# Patient Record
Sex: FEMALE | Race: OTHER | NOT HISPANIC OR LATINO | URBAN - METROPOLITAN AREA
[De-identification: names, ages, dates, MRNs, and addresses within clinical notes are randomized per-mention and may not be internally consistent; named-entity substitution may affect disease eponyms.]

---

## 2023-11-06 ENCOUNTER — EMERGENCY (EMERGENCY)
Facility: HOSPITAL | Age: 43
LOS: 1 days | Discharge: HOME | End: 2023-11-06
Attending: EMERGENCY MEDICINE
Payer: COMMERCIAL

## 2023-11-06 VITALS
SYSTOLIC BLOOD PRESSURE: 123 MMHG | HEART RATE: 80 BPM | DIASTOLIC BLOOD PRESSURE: 85 MMHG | RESPIRATION RATE: 20 BRPM | TEMPERATURE: 98 F | OXYGEN SATURATION: 99 %

## 2023-11-06 DIAGNOSIS — Z98.890 OTHER SPECIFIED POSTPROCEDURAL STATES: Chronic | ICD-10-CM

## 2023-11-06 LAB
ALBUMIN SERPL ELPH-MCNC: 4.2 G/DL — SIGNIFICANT CHANGE UP (ref 3.3–5.2)
ALBUMIN SERPL ELPH-MCNC: 4.2 G/DL — SIGNIFICANT CHANGE UP (ref 3.3–5.2)
ALP SERPL-CCNC: 72 U/L — SIGNIFICANT CHANGE UP (ref 40–120)
ALP SERPL-CCNC: 72 U/L — SIGNIFICANT CHANGE UP (ref 40–120)
ALT FLD-CCNC: 27 U/L — SIGNIFICANT CHANGE UP
ALT FLD-CCNC: 27 U/L — SIGNIFICANT CHANGE UP
ANION GAP SERPL CALC-SCNC: 9 MMOL/L — SIGNIFICANT CHANGE UP (ref 5–17)
ANION GAP SERPL CALC-SCNC: 9 MMOL/L — SIGNIFICANT CHANGE UP (ref 5–17)
AST SERPL-CCNC: 25 U/L — SIGNIFICANT CHANGE UP
AST SERPL-CCNC: 25 U/L — SIGNIFICANT CHANGE UP
BASOPHILS # BLD AUTO: 0.04 K/UL — SIGNIFICANT CHANGE UP (ref 0–0.2)
BASOPHILS # BLD AUTO: 0.04 K/UL — SIGNIFICANT CHANGE UP (ref 0–0.2)
BASOPHILS NFR BLD AUTO: 0.5 % — SIGNIFICANT CHANGE UP (ref 0–2)
BASOPHILS NFR BLD AUTO: 0.5 % — SIGNIFICANT CHANGE UP (ref 0–2)
BILIRUB SERPL-MCNC: <0.2 MG/DL — LOW (ref 0.4–2)
BILIRUB SERPL-MCNC: <0.2 MG/DL — LOW (ref 0.4–2)
BUN SERPL-MCNC: 12.3 MG/DL — SIGNIFICANT CHANGE UP (ref 8–20)
BUN SERPL-MCNC: 12.3 MG/DL — SIGNIFICANT CHANGE UP (ref 8–20)
CALCIUM SERPL-MCNC: 8.9 MG/DL — SIGNIFICANT CHANGE UP (ref 8.4–10.5)
CALCIUM SERPL-MCNC: 8.9 MG/DL — SIGNIFICANT CHANGE UP (ref 8.4–10.5)
CHLORIDE SERPL-SCNC: 102 MMOL/L — SIGNIFICANT CHANGE UP (ref 96–108)
CHLORIDE SERPL-SCNC: 102 MMOL/L — SIGNIFICANT CHANGE UP (ref 96–108)
CO2 SERPL-SCNC: 25 MMOL/L — SIGNIFICANT CHANGE UP (ref 22–29)
CO2 SERPL-SCNC: 25 MMOL/L — SIGNIFICANT CHANGE UP (ref 22–29)
CREAT SERPL-MCNC: 0.64 MG/DL — SIGNIFICANT CHANGE UP (ref 0.5–1.3)
CREAT SERPL-MCNC: 0.64 MG/DL — SIGNIFICANT CHANGE UP (ref 0.5–1.3)
EGFR: 113 ML/MIN/1.73M2 — SIGNIFICANT CHANGE UP
EGFR: 113 ML/MIN/1.73M2 — SIGNIFICANT CHANGE UP
EOSINOPHIL # BLD AUTO: 0.06 K/UL — SIGNIFICANT CHANGE UP (ref 0–0.5)
EOSINOPHIL # BLD AUTO: 0.06 K/UL — SIGNIFICANT CHANGE UP (ref 0–0.5)
EOSINOPHIL NFR BLD AUTO: 0.7 % — SIGNIFICANT CHANGE UP (ref 0–6)
EOSINOPHIL NFR BLD AUTO: 0.7 % — SIGNIFICANT CHANGE UP (ref 0–6)
GLUCOSE SERPL-MCNC: 114 MG/DL — HIGH (ref 70–99)
GLUCOSE SERPL-MCNC: 114 MG/DL — HIGH (ref 70–99)
HCT VFR BLD CALC: 34.9 % — SIGNIFICANT CHANGE UP (ref 34.5–45)
HCT VFR BLD CALC: 34.9 % — SIGNIFICANT CHANGE UP (ref 34.5–45)
HGB BLD-MCNC: 11.1 G/DL — LOW (ref 11.5–15.5)
HGB BLD-MCNC: 11.1 G/DL — LOW (ref 11.5–15.5)
IMM GRANULOCYTES NFR BLD AUTO: 0.4 % — SIGNIFICANT CHANGE UP (ref 0–0.9)
IMM GRANULOCYTES NFR BLD AUTO: 0.4 % — SIGNIFICANT CHANGE UP (ref 0–0.9)
LYMPHOCYTES # BLD AUTO: 2.08 K/UL — SIGNIFICANT CHANGE UP (ref 1–3.3)
LYMPHOCYTES # BLD AUTO: 2.08 K/UL — SIGNIFICANT CHANGE UP (ref 1–3.3)
LYMPHOCYTES # BLD AUTO: 24.9 % — SIGNIFICANT CHANGE UP (ref 13–44)
LYMPHOCYTES # BLD AUTO: 24.9 % — SIGNIFICANT CHANGE UP (ref 13–44)
MCHC RBC-ENTMCNC: 27.3 PG — SIGNIFICANT CHANGE UP (ref 27–34)
MCHC RBC-ENTMCNC: 27.3 PG — SIGNIFICANT CHANGE UP (ref 27–34)
MCHC RBC-ENTMCNC: 31.8 GM/DL — LOW (ref 32–36)
MCHC RBC-ENTMCNC: 31.8 GM/DL — LOW (ref 32–36)
MCV RBC AUTO: 85.7 FL — SIGNIFICANT CHANGE UP (ref 80–100)
MCV RBC AUTO: 85.7 FL — SIGNIFICANT CHANGE UP (ref 80–100)
MONOCYTES # BLD AUTO: 0.67 K/UL — SIGNIFICANT CHANGE UP (ref 0–0.9)
MONOCYTES # BLD AUTO: 0.67 K/UL — SIGNIFICANT CHANGE UP (ref 0–0.9)
MONOCYTES NFR BLD AUTO: 8 % — SIGNIFICANT CHANGE UP (ref 2–14)
MONOCYTES NFR BLD AUTO: 8 % — SIGNIFICANT CHANGE UP (ref 2–14)
NEUTROPHILS # BLD AUTO: 5.48 K/UL — SIGNIFICANT CHANGE UP (ref 1.8–7.4)
NEUTROPHILS # BLD AUTO: 5.48 K/UL — SIGNIFICANT CHANGE UP (ref 1.8–7.4)
NEUTROPHILS NFR BLD AUTO: 65.5 % — SIGNIFICANT CHANGE UP (ref 43–77)
NEUTROPHILS NFR BLD AUTO: 65.5 % — SIGNIFICANT CHANGE UP (ref 43–77)
PLATELET # BLD AUTO: 302 K/UL — SIGNIFICANT CHANGE UP (ref 150–400)
PLATELET # BLD AUTO: 302 K/UL — SIGNIFICANT CHANGE UP (ref 150–400)
POTASSIUM SERPL-MCNC: 4.2 MMOL/L — SIGNIFICANT CHANGE UP (ref 3.5–5.3)
POTASSIUM SERPL-MCNC: 4.2 MMOL/L — SIGNIFICANT CHANGE UP (ref 3.5–5.3)
POTASSIUM SERPL-SCNC: 4.2 MMOL/L — SIGNIFICANT CHANGE UP (ref 3.5–5.3)
POTASSIUM SERPL-SCNC: 4.2 MMOL/L — SIGNIFICANT CHANGE UP (ref 3.5–5.3)
PROT SERPL-MCNC: 7 G/DL — SIGNIFICANT CHANGE UP (ref 6.6–8.7)
PROT SERPL-MCNC: 7 G/DL — SIGNIFICANT CHANGE UP (ref 6.6–8.7)
RBC # BLD: 4.07 M/UL — SIGNIFICANT CHANGE UP (ref 3.8–5.2)
RBC # BLD: 4.07 M/UL — SIGNIFICANT CHANGE UP (ref 3.8–5.2)
RBC # FLD: 13.6 % — SIGNIFICANT CHANGE UP (ref 10.3–14.5)
RBC # FLD: 13.6 % — SIGNIFICANT CHANGE UP (ref 10.3–14.5)
SODIUM SERPL-SCNC: 136 MMOL/L — SIGNIFICANT CHANGE UP (ref 135–145)
SODIUM SERPL-SCNC: 136 MMOL/L — SIGNIFICANT CHANGE UP (ref 135–145)
TROPONIN T, HIGH SENSITIVITY RESULT: <6 NG/L — SIGNIFICANT CHANGE UP (ref 0–51)
TROPONIN T, HIGH SENSITIVITY RESULT: <6 NG/L — SIGNIFICANT CHANGE UP (ref 0–51)
WBC # BLD: 8.36 K/UL — SIGNIFICANT CHANGE UP (ref 3.8–10.5)
WBC # BLD: 8.36 K/UL — SIGNIFICANT CHANGE UP (ref 3.8–10.5)
WBC # FLD AUTO: 8.36 K/UL — SIGNIFICANT CHANGE UP (ref 3.8–10.5)
WBC # FLD AUTO: 8.36 K/UL — SIGNIFICANT CHANGE UP (ref 3.8–10.5)

## 2023-11-06 PROCEDURE — T1013: CPT

## 2023-11-06 PROCEDURE — 99285 EMERGENCY DEPT VISIT HI MDM: CPT | Mod: 25

## 2023-11-06 PROCEDURE — 99284 EMERGENCY DEPT VISIT MOD MDM: CPT

## 2023-11-06 PROCEDURE — 84484 ASSAY OF TROPONIN QUANT: CPT

## 2023-11-06 PROCEDURE — 93005 ELECTROCARDIOGRAM TRACING: CPT

## 2023-11-06 PROCEDURE — 85025 COMPLETE CBC W/AUTO DIFF WBC: CPT

## 2023-11-06 PROCEDURE — 71045 X-RAY EXAM CHEST 1 VIEW: CPT

## 2023-11-06 PROCEDURE — 80053 COMPREHEN METABOLIC PANEL: CPT

## 2023-11-06 PROCEDURE — 71045 X-RAY EXAM CHEST 1 VIEW: CPT | Mod: 26

## 2023-11-06 PROCEDURE — 36415 COLL VENOUS BLD VENIPUNCTURE: CPT

## 2023-11-06 PROCEDURE — 93010 ELECTROCARDIOGRAM REPORT: CPT

## 2023-11-06 RX ORDER — IBUPROFEN 200 MG
400 TABLET ORAL ONCE
Refills: 0 | Status: COMPLETED | OUTPATIENT
Start: 2023-11-06 | End: 2023-11-06

## 2023-11-06 RX ADMIN — Medication 400 MILLIGRAM(S): at 19:51

## 2023-11-06 NOTE — ED PROVIDER NOTE - CHIEF COMPLAINT
The patient is a 43y Female complaining of multiple medical complaints. The patient is a 42yo female complaining of multiple medical complaints.

## 2023-11-06 NOTE — ED PROVIDER NOTE - PHYSICAL EXAMINATION
General: awake, alert, supine in NAD  HEENT: normocephalic, atraumatic, PERRLA, EOMI, no scleral or conjunctival injection, moist mucous membranes, oropharynx clear   Neck: soft, supple  Cardiac: RRR, mild lower extremity edema  Resp: lungs CTAB, no accessory muscle use  Abd: soft, non-tender, non-distended, no guarding, rebound or rigidity   Skin: warm, well-perfused; no apparent rashes, abrasions or lacerations  Neuro: AO x 3, CN II-XII intact, moves all extremities symmetrically, motor strength and sensation grossly intact, DTR symmetric, intact   Psych: appropriate mood and affect

## 2023-11-06 NOTE — ED PROVIDER NOTE - ATTENDING CONTRIBUTION TO CARE
Pt states that she had an episode of sharp l-sided chest pain that was brief and associated with generalized weakness and head pressure. Pt has had two similar episodes in the past and is being worked up by neuro as an outaptient. Pt now feels better. no longer having chest symptoms.    physical - rrr. ctab. abd - soft, nt. no edema. no rash.    plan - Pt with chest pain, weakness.  symptoms c/w anxiety.  will check ekg, cxr, labs.  if neg plan to d/c with outpatient f/up. Pt signed out to dr. vera.

## 2023-11-06 NOTE — ED PROVIDER NOTE - CLINICAL SUMMARY MEDICAL DECISION MAKING FREE TEXT BOX
42yo female with a history of hyperlipidemia presents following a 3rd episode of stabbing chest pain 2h ago. The episode was associated with shortness of breath, diaphoresis, palpitations and lightheadedness. She felt presyncopal and was unable to stand or speak. The episode resolved after taking aspirin and Motrin. Due to recurring incidents, she is currently seeing a neurologist for a outpatient management. Exam, labs, EKG and imaging were reassuring, the episode possibly occurred in the context of a panic attack. Continued follow up in an outpatient setting was discussed with the patient.

## 2023-11-06 NOTE — ED PROVIDER NOTE - OBJECTIVE STATEMENT
44yo female with a history of hyperlipidemia presents following an episode of stabbing chest pain 2h ago. The episode was associated with shortness of breath, diaphoresis, palpitations and lightheadedness. She felt presyncopal and was unable to stand or speak. The episode resolved after taking aspirin and Motrin, currently she only feels a pressure in her head. She has had 2 previous similar incidents in the past, and is seeing a neurologist for a outpatient management. Per patient various tests were done two weeks ago; she is waiting for the results.

## 2023-11-06 NOTE — ED PROVIDER NOTE - PATIENT PORTAL LINK FT
You can access the FollowMyHealth Patient Portal offered by [facility] by registering at the following website: [email or web address]. By joining [facility]’s FollowMyHealth portal, you will also be able to view your health information using other applications (apps) compatible with our system.

## 2023-11-06 NOTE — ED ADULT NURSE REASSESSMENT NOTE - NS ED NURSE REASSESS COMMENT FT1
assumed care of patient from AM CLAUDIA Gaming at 1930. patient is resting in stretcher without complaint or acute signs of distress. patient safety maintained.

## 2023-11-06 NOTE — ED PROVIDER NOTE - PLAN OF CARE
42yo female with a history of hyperlipidemia presents following an episode of stabbing chest pain 2h ago. The episode was associated with shortness of breath, diaphoresis, palpitations and lightheadedness, the episode resolved after taking aspirin and Motrin. Exam, labs and imaging were reassuring, so that outpatient management and f/u with her neurologist was discussed.

## 2023-11-06 NOTE — ED ADULT TRIAGE NOTE - CHIEF COMPLAINT QUOTE
patient arrives slow to respond staring into space. responding to all questions appropriately but slowly.  30 min prior to arrival pt states she was having chest pain headache and difficulty "moving her jaw" to speak due to "stiffness". family reports same thing happened 2 months ago, was given referral to neurologist. pt reports generalized weakness.

## 2023-11-06 NOTE — ED ADULT NURSE NOTE - NSFALLUNIVINTERV_ED_ALL_ED
Bed/Stretcher in lowest position, wheels locked, appropriate side rails in place/Call bell, personal items and telephone in reach/Instruct patient to call for assistance before getting out of bed/chair/stretcher/Non-slip footwear applied when patient is off stretcher/[place] to call system/Physically safe environment - no spills, clutter or unnecessary equipment/Purposeful proactive rounding/Room/bathroom lighting operational, light cord in reach

## 2023-11-06 NOTE — ED PROVIDER NOTE - CARE PLAN
1 Principal Discharge DX:	Panic attack  Assessment and plan of treatment:	42yo female with a history of hyperlipidemia presents following an episode of stabbing chest pain 2h ago. The episode was associated with shortness of breath, diaphoresis, palpitations and lightheadedness, the episode resolved after taking aspirin and Motrin. Exam, labs and imaging were reassuring, so that outpatient management and f/u with her neurologist was discussed.

## 2023-11-06 NOTE — ED PROVIDER NOTE - NS ED ROS FT
General: denies fever, chills  HEENT: confirms headache, see above  Resp: denies cough  Cardiovasc.: see above  GI: denies pain, nausea, vomiting, diarrhea, constipation  : denies problems with urination

## 2023-11-13 PROBLEM — Z00.00 ENCOUNTER FOR PREVENTIVE HEALTH EXAMINATION: Status: ACTIVE | Noted: 2023-11-13

## 2024-01-29 ENCOUNTER — APPOINTMENT (OUTPATIENT)
Dept: NEUROLOGY | Facility: CLINIC | Age: 44
End: 2024-01-29
Payer: MEDICAID

## 2024-01-29 VITALS — SYSTOLIC BLOOD PRESSURE: 110 MMHG | DIASTOLIC BLOOD PRESSURE: 68 MMHG

## 2024-01-29 VITALS — DIASTOLIC BLOOD PRESSURE: 68 MMHG | HEIGHT: 64 IN | SYSTOLIC BLOOD PRESSURE: 110 MMHG

## 2024-01-29 DIAGNOSIS — Z86.39 PERSONAL HISTORY OF OTHER ENDOCRINE, NUTRITIONAL AND METABOLIC DISEASE: ICD-10-CM

## 2024-01-29 DIAGNOSIS — Z83.3 FAMILY HISTORY OF DIABETES MELLITUS: ICD-10-CM

## 2024-01-29 DIAGNOSIS — G31.84 MILD COGNITIVE IMPAIRMENT, SO STATED: ICD-10-CM

## 2024-01-29 DIAGNOSIS — R42 DIZZINESS AND GIDDINESS: ICD-10-CM

## 2024-01-29 DIAGNOSIS — M54.12 RADICULOPATHY, CERVICAL REGION: ICD-10-CM

## 2024-01-29 DIAGNOSIS — G44.89 OTHER HEADACHE SYNDROME: ICD-10-CM

## 2024-01-29 PROCEDURE — 99204 OFFICE O/P NEW MOD 45 MIN: CPT

## 2024-01-29 PROCEDURE — G2211 COMPLEX E/M VISIT ADD ON: CPT

## 2024-01-29 RX ORDER — SIMVASTATIN 80 MG/1
TABLET, FILM COATED ORAL
Refills: 0 | Status: ACTIVE | COMMUNITY

## 2024-01-29 RX ORDER — IBUPROFEN 200 MG/1
CAPSULE, LIQUID FILLED ORAL
Refills: 0 | Status: ACTIVE | COMMUNITY

## 2024-01-29 RX ORDER — GABAPENTIN 100 MG/1
CAPSULE ORAL
Refills: 0 | Status: ACTIVE | COMMUNITY

## 2024-01-29 NOTE — DATA REVIEWED
[de-identified] : Brain MRI was performed on 9/23/2023 at [facility]. The study was unremarkable. There were scattered white matter hyperintensities seen which are consistent with ischemic change. [de-identified] : Cervical spine MRI was performed on 12/21/2023 at [facility]. The study demonstrated multilevel degenerative changes and disc bulging most notable at C5/6.

## 2024-01-29 NOTE — HISTORY OF PRESENT ILLNESS
[FreeTextEntry1] : I saw this patient in the office today.  She describes pains in her head radiating to the right side of her neck and down into the arms. She describes numbness and tingling in both hands with the right being more than the left. She also describes spinning type dizziness when she turns her head.  She ultimately went to the emergency room at [facility].  She was kept for observation. MRI of the brain was performed which was negative and she was discharged.  She also describes some memory difficulty although she is not able to tell me how long this has been going on.

## 2024-01-29 NOTE — PHYSICAL EXAM
[General Appearance - Alert] : alert [General Appearance - In No Acute Distress] : in no acute distress [Oriented To Time, Place, And Person] : oriented to person, place, and time [Affect] : the affect was normal [Memory Remote] : remote memory was not impaired [Dysarthria] : no dysarthria [Aphasia] : no dysphasia/aphasia [Total Score ___ / 30] : the patient achieved a score of [unfilled] /30 [Date / Time ___ / 5] : date / time [unfilled] / 5 [Place ___ / 5] : place [unfilled] / 5 [Registration ___ / 3] : registration [unfilled] / 3 [Serial Sevens ___/5] : serial sevens [unfilled] / 5 [Naming 2 Objects ___ / 2] : naming two objects [unfilled] / 2 [Repeating a Sentence ___ / 1] : repeating a sentence [unfilled] / 1 [Writing a Sentence ___ / 1] : write sentence [unfilled] / 1 [3-stage Verbal Command ___ / 3] : three-stage verbal command [unfilled] / 3 [Written Command ___ / 1] : written command [unfilled] / 1 [Copy a Design ___ / 1] : copy a design [unfilled] / 1 [Recall ___ / 3] : recall [unfilled] / 3 [Cranial Nerves Optic (II)] : visual acuity intact bilaterally,  visual fields full to confrontation, pupils equal round and reactive to light [Cranial Nerves Oculomotor (III)] : extraocular motion intact [Cranial Nerves Trigeminal (V)] : facial sensation intact symmetrically [Cranial Nerves Facial (VII)] : face symmetrical [Cranial Nerves Vestibulocochlear (VIII)] : hearing was intact bilaterally [Cranial Nerves Glossopharyngeal (IX)] : tongue and palate midline [Cranial Nerves Accessory (XI - Cranial And Spinal)] : head turning and shoulder shrug symmetric [Cranial Nerves Hypoglossal (XII)] : there was no tongue deviation with protrusion [Motor Tone] : muscle tone was normal in all four extremities [Motor Strength] : muscle strength was normal in all four extremities [Sensation Tactile Decrease] : light touch was intact [Sensation Vibration Decrease] : vibration was intact [Sensation Pain / Temperature Decrease] : pain and temperature was intact [Romberg's Sign] : Romberg's sign was negtive [Abnormal Walk] : normal gait [Coordination - Dysmetria Impaired Finger-to-Nose Bilateral] : not present [2+] : Ankle jerk left 2+ [Plantar Reflex Right Only] : normal on the right [Plantar Reflex Left Only] : normal on the left [Optic Disc Abnormality] : the optic disc were normal in size and color

## 2024-01-29 NOTE — ASSESSMENT
[FreeTextEntry1] : This is a 43-year-old woman with symptoms of headaches, neck pains, and vertigo. Brain imaging has been negative. Cervical spine imaging has demonstrated degenerative changes and disc bulges. She went for physical therapy which made her pain worse rather than better. She reports that she has been to pain management already. Given the numbness and tingling into the hands, I would like to obtain EMG and nerve conduction studies to assess for peripheral nerve impingements and nerve root compression. I advised her to continue gabapentin for now and to return to her pain management specialist.  She describes some memory difficulty as well. Brain imaging has been negative. I will obtain an EEG as well as some blood test to look for reversible causes.  I will see her back after the above workup.

## 2024-01-29 NOTE — CONSULT LETTER
[Dear  ___] : Dear  [unfilled], [Courtesy Letter:] : I had the pleasure of seeing your patient, [unfilled], in my office today. [Please see my note below.] : Please see my note below. [Consult Closing:] : Thank you very much for allowing me to participate in the care of this patient.  If you have any questions, please do not hesitate to contact me. [Sincerely,] : Sincerely, [FreeTextEntry3] : Hima Daly MD.

## 2024-01-31 RX ORDER — SIMVASTATIN 20 MG/1
1 TABLET, FILM COATED ORAL
Refills: 0 | DISCHARGE

## 2024-02-06 PROBLEM — E78.5 HYPERLIPIDEMIA, UNSPECIFIED: Chronic | Status: ACTIVE | Noted: 2023-11-06

## 2024-02-06 PROBLEM — K58.9 IRRITABLE BOWEL SYNDROME WITHOUT DIARRHEA: Chronic | Status: ACTIVE | Noted: 2023-11-06

## 2024-02-08 ENCOUNTER — LABORATORY RESULT (OUTPATIENT)
Age: 44
End: 2024-02-08

## 2024-02-09 LAB
FOLATE SERPL-MCNC: >20 NG/ML
T3RU NFR SERPL: 1.1 TBI
T4 SERPL-MCNC: 6.6 UG/DL
TSH SERPL-ACNC: 2.42 UIU/ML
VIT B12 SERPL-MCNC: >2000 PG/ML

## 2024-02-15 LAB — METHYLMALONATE SERPL-SCNC: 100 NMOL/L

## 2024-02-20 ENCOUNTER — APPOINTMENT (OUTPATIENT)
Dept: NEUROLOGY | Facility: CLINIC | Age: 44
End: 2024-02-20
Payer: COMMERCIAL

## 2024-02-20 PROCEDURE — 93040 RHYTHM ECG WITH REPORT: CPT

## 2024-02-20 PROCEDURE — 95819 EEG AWAKE AND ASLEEP: CPT

## 2024-03-15 ENCOUNTER — APPOINTMENT (OUTPATIENT)
Dept: ORTHOPEDIC SURGERY | Facility: CLINIC | Age: 44
End: 2024-03-15
Payer: COMMERCIAL

## 2024-03-15 VITALS — HEART RATE: 84 BPM | SYSTOLIC BLOOD PRESSURE: 116 MMHG | DIASTOLIC BLOOD PRESSURE: 81 MMHG | HEIGHT: 64 IN

## 2024-03-15 DIAGNOSIS — M47.812 SPONDYLOSIS W/OUT MYELOPATHY OR RADICULOPATHY, CERVICAL REGION: ICD-10-CM

## 2024-03-15 DIAGNOSIS — M67.912 UNSPECIFIED DISORDER OF SYNOVIUM AND TENDON, LEFT SHOULDER: ICD-10-CM

## 2024-03-15 PROCEDURE — 99204 OFFICE O/P NEW MOD 45 MIN: CPT

## 2024-03-15 PROCEDURE — 72040 X-RAY EXAM NECK SPINE 2-3 VW: CPT

## 2024-03-15 NOTE — DISCUSSION/SUMMARY
[2 Weeks] : in 2 weeks [de-identified] : 45 minutes was spent reviewing the x-rays as well as discussing with the patient their clinical presentation, diagnosis and providing education.  Conservative treatment was discussed with the patient at length. Anticipatory guidance regarding disease process cervical spondylosis, left upper extremity radiculitis, left shoulder tendinopathy, avoidance of acute exacerbation this was discussed at length and all patients commenting concerns were answered to the patient's satisfaction. Physical therapy for decrease pain and increase function was ordered to resume.  Intermittent use of acetaminophen 500 mg 2 tablets t.i.d. p.r.n. mild to moderate pain, ibuprofen 600 mg t.i.d. p.r.n. severe pain with food or milk if there is no medical contraindication. Home exercise including stretching on a daily basis for 20-30 minutes was recommended. Heat, ice, topical were discussed as needed. The patient will followup in 2 weeks at which point we will go over new MRI studies.  No MRI of the cervical spine is ordered due to profound weakness of the left upper extremity not particularly concordant with her cervical MRI done in December 2023.  Left shoulder MRI is ordered as well for focal left shoulder pain.  And is failing conservative care and we will discuss cervical surgery on next visit depending on MRI results.  Will need a CT scan of the cervical spine the patient would like to proceed with ACDF type surgery for neck and upper extremity pain.

## 2024-03-15 NOTE — PHYSICAL EXAM
[de-identified] : CONSTITUTIONAL:  Patient is a very pleasant individual who is well-nourished and appears stated age.  PSYCHIATRIC:  Alert and oriented times three and in no apparent distress, and participates with orthopedic evaluation well. HEAD:  Atraumatic and  nonsyndromic in appearance. EENT: No thyromegaly, EOMI. RESPIRATORY:  Respiratory rate is regular, not dyspneic on examination. LYMPHATICS:  There is no cervical or axillary lymphadenopathy. INTEGUMENTARY:  Skin is clean, dry, and intact about the bilateral upper extremities and cervical spine.  VASCULAR:   There is brisk capillary refill about the bilateral upper extremities and radial pulses are 2/4.  NEUROLOGIC:  Negative L'hirmitte, positive right-sided Spurling's sign. There are no pathologic reflexes. There is C6 decrease in sensation of the left upper extremity.  Deep tendon reflexes are well-maintained at +2/4 right upper extremity, 1 out of 4 wrist left upper extremity.  Bilateral lower extremities 2 out of 2 and symmetric. MUSCULOSKELETAL:  There is no visible muscular atrophy.   Cervical range of motion is painful in flexion of any kind there is pain 9 out of 10.  The patient ambulates in a non-myelopathic manner. Normal secondary orthopaedic exam of bilateral  elbows and hands.  Elbow flexion and extension, wrist extension, finger flexion and abduction are well maintained on the right, on the left weakness in biceps and wrist extension 3 out of 5..  Left shoulder tendinopathy type symptoms, positive pain on abduction greater than 100 degrees, positive pain on adduction of the left shoulder as well.    [de-identified] : 4 views of the lumbar spine have been reviewed from February 29, 2024 shows well-maintained lordosis no significant discogenic processes somewhat of an obliquity noted no acute osseous abnormalities pedicles are well-maintained and visualized.  MRI of the cervical spine has been reviewed from December 21, 2023 demonstrates a disc protrusion at C4-C5 disc protrusion at C5-C6.  No significant central stenosis, at both levels there is noted moderate to severe foraminal stenosis at 4 5 and 5 6.  X-rays of the cervical spine have been reviewed demonstrates loss of cervical lordosis 4 5 and more so 5 6 cervical spondylosis small osteophytes posteriorly at C5-C6

## 2024-03-15 NOTE — HISTORY OF PRESENT ILLNESS
[de-identified] : 43-year-old female is here for severe neck pain which has been going on since September 2023 without any precipitating incident incident.  Pain is viselike it radiates up to her head giving her headaches that radiates downward between her shoulders it is interrupting her ability to accomplish activities of daily living because it is painful to wash her hair and shower it is painful to do food shopping as lifting exacerbates her pain.  Pain rates 9 out of 10 on the pain scale on a regular basis she says she can handle it from time to time it makes her upset because the pain is so significant.  She has been to physical therapy 18 visits particularly for neck and left greater than right upper extremity pain within the last 6 months.  She continues to do home exercises without any significant improvement in pain and in fact pain is worsening she has new onset symptoms of weakness tingling numbness burning down the left upper extremity.  For the last 6 weeks.  She has seen pain management provider, has had several emergency department visits due to this pain.  There is no change in bowel or bladder but she does state her penmanship has looked strange and her dexterity is decreased since this new onset left upper extremity pain.  Past medical surgical social family allergy history is reviewed.

## 2024-04-02 ENCOUNTER — APPOINTMENT (OUTPATIENT)
Dept: NEUROLOGY | Facility: CLINIC | Age: 44
End: 2024-04-02
Payer: COMMERCIAL

## 2024-04-02 PROCEDURE — 95886 MUSC TEST DONE W/N TEST COMP: CPT

## 2024-04-02 PROCEDURE — 95910 NRV CNDJ TEST 7-8 STUDIES: CPT

## 2024-06-11 ENCOUNTER — APPOINTMENT (OUTPATIENT)
Dept: PLASTIC SURGERY | Facility: CLINIC | Age: 44
End: 2024-06-11
Payer: COMMERCIAL

## 2024-06-11 VITALS
OXYGEN SATURATION: 99 % | SYSTOLIC BLOOD PRESSURE: 146 MMHG | DIASTOLIC BLOOD PRESSURE: 77 MMHG | HEART RATE: 104 BPM | TEMPERATURE: 98 F | HEIGHT: 64 IN

## 2024-06-11 DIAGNOSIS — T85.848A PAIN DUE TO OTHER INTERNAL PROSTHETIC DEVICES, IMPLANTS AND GRAFTS, INITIAL ENCOUNTER: ICD-10-CM

## 2024-06-11 DIAGNOSIS — T85.44XA CAPSULAR CONTRACTURE OF BREAST IMPLANT, INITIAL ENCOUNTER: ICD-10-CM

## 2024-06-11 PROCEDURE — 99204 OFFICE O/P NEW MOD 45 MIN: CPT

## 2024-06-13 NOTE — PHYSICAL EXAM
[NI] : Normal [de-identified] : Please see scanned in breast sheet SN-N: L- 26 R- 26.5 N-IMF: L- 16, R- 16 BW: L/R: 15.5

## 2024-06-13 NOTE — HISTORY OF PRESENT ILLNESS
[FreeTextEntry1] : Ms. JOSÉ TONG  is a 43 year yo female  who has a history of bilateral breast augmentation, who has developed bilateral breast pain associated with periprosthetic scarring. She  complains of bilateral breast pain associated with her  breast implants. She  is unable to sleep on her  stomach due to severe pain associated with the breast implants.  She  is also unable to do activities of daily life like running, biking, raising her arms above her head or out to the side, and lifting.  She states that she  initially was an A cup and underwent breast augmentation in 2011  and this brought her up to a D  cup.  She currently has bilateral silicone [place] implants that were placed in [place]. They are smooth implants and were placed above the muscle.   She  states that she  has never felt comfortable with implants and is worried about the health risks.  She is concerned about the risk of anaplastic large cell lymphoma.   She  has worried about the health implications of the implants, and she  has had some systemic issues which she  is unsure are related to the implants.  Specifically has complaints of joint pain, hair loss, chronic fatigue, vision changes, and poor memory. She  has been recommended to have the implants removed.        She  does not wish to have any more implants placed and I would not recommend doing that given the health concerns, and concerns for ALCL, and the painful bilateral capsular contractures

## 2024-06-13 NOTE — ASSESSMENT
[FreeTextEntry1] : 43 year yo female with a history of bilateral silicone, bilateral painful, visible, capsular contractures, (Baker IV) and a concern for anaplastic large cell lymphoma (ALCL) and systemic issues stemming from implants.  I feel that it is a reasonable plan to go to the operating room to remove both breast implants and perform bilateral periprosthetic capsulectomies with pectoralis muscle repair. Due to the years of having her implants, her muscle has been stretched and she will require a muscle repair to re-drape the muscle. The muscle atrophy caused by the implants may make the muscle repair challenging and a tissue transfer will be needed to help fill in deficient areas in the breast. Additionally, the muscle insertion to the inferior mammary fold was detached during her previous surgery and will require reconstruction to restore function to the muscle. The pectoralis muscle repair will improve her ability to perform activities of daily living. I advised that, given the extent of the injury to skin from the implants, she will need a reconstructive mastopexy given breast shape, significant ptosis, skin quality, and will plan to do a mastopexy at the time of explantation.    In addition to addressing the above concerns, this may make breast cancer surveillance easier in the future.  Risks, benefits, and alternatives were discussed including the risks of infection, bleeding, seroma, hematoma, asymmetry, nipple necrosis, change in sensitivity of nipples, change in breast skin sensation, fat necrosis, oil cysts, poor scarring, keloid formation, hypertrophic scarring, dehiscence, and delayed wound healing.  The patient understands these risks, all questions were answered and she wishes to proceed with bilateral breast implant removal, bilateral periprosthetic capsulectomy, bilateral reconstructive mastopexy to restore volume lost to atrophy directly related to the implants.  Informed consent was obtained.

## 2024-07-29 ENCOUNTER — APPOINTMENT (OUTPATIENT)
Dept: NEUROLOGY | Facility: CLINIC | Age: 44
End: 2024-07-29

## 2024-11-04 ENCOUNTER — APPOINTMENT (OUTPATIENT)
Dept: PLASTIC SURGERY | Facility: AMBULATORY SURGERY CENTER | Age: 44
End: 2024-11-04